# Patient Record
Sex: MALE | Race: OTHER | NOT HISPANIC OR LATINO | URBAN - METROPOLITAN AREA
[De-identification: names, ages, dates, MRNs, and addresses within clinical notes are randomized per-mention and may not be internally consistent; named-entity substitution may affect disease eponyms.]

---

## 2024-07-17 ENCOUNTER — OFFICE VISIT (OUTPATIENT)
Dept: PODIATRY | Facility: CLINIC | Age: 64
End: 2024-07-17
Payer: MEDICARE

## 2024-07-17 VITALS — WEIGHT: 249 LBS | HEIGHT: 67 IN | BODY MASS INDEX: 39.08 KG/M2

## 2024-07-17 DIAGNOSIS — E11.9 COMPREHENSIVE DIABETIC FOOT EXAMINATION, TYPE 2 DM, ENCOUNTER FOR: Primary | ICD-10-CM

## 2024-07-17 DIAGNOSIS — B35.3 TINEA PEDIS OF BOTH FEET: ICD-10-CM

## 2024-07-17 PROCEDURE — 3008F BODY MASS INDEX DOCD: CPT | Mod: CPTII,S$GLB,, | Performed by: PODIATRIST

## 2024-07-17 PROCEDURE — 1126F AMNT PAIN NOTED NONE PRSNT: CPT | Mod: CPTII,S$GLB,, | Performed by: PODIATRIST

## 2024-07-17 PROCEDURE — 99999 PR PBB SHADOW E&M-NEW PATIENT-LVL III: CPT | Mod: PBBFAC,,, | Performed by: PODIATRIST

## 2024-07-17 PROCEDURE — 99203 OFFICE O/P NEW LOW 30 MIN: CPT | Mod: S$GLB,,, | Performed by: PODIATRIST

## 2024-07-17 PROCEDURE — 1101F PT FALLS ASSESS-DOCD LE1/YR: CPT | Mod: CPTII,S$GLB,, | Performed by: PODIATRIST

## 2024-07-17 PROCEDURE — 1159F MED LIST DOCD IN RCRD: CPT | Mod: CPTII,S$GLB,, | Performed by: PODIATRIST

## 2024-07-17 PROCEDURE — 3288F FALL RISK ASSESSMENT DOCD: CPT | Mod: CPTII,S$GLB,, | Performed by: PODIATRIST

## 2024-07-17 RX ORDER — AMLODIPINE BESYLATE 10 MG/1
10 TABLET ORAL
COMMUNITY
Start: 2024-05-03

## 2024-07-17 RX ORDER — CALCIUM CITRATE/VITAMIN D3 200MG-6.25
TABLET ORAL
COMMUNITY
Start: 2024-05-31

## 2024-07-17 RX ORDER — CARVEDILOL 12.5 MG/1
12.5 TABLET ORAL 2 TIMES DAILY
COMMUNITY
Start: 2024-04-04

## 2024-07-17 RX ORDER — ATORVASTATIN CALCIUM 80 MG/1
80 TABLET, FILM COATED ORAL
COMMUNITY
Start: 2024-04-12

## 2024-07-17 RX ORDER — ACETAMINOPHEN 500 MG
1 TABLET ORAL
COMMUNITY

## 2024-07-17 RX ORDER — CICLOPIROX 7.7 MG/G
GEL TOPICAL 2 TIMES DAILY
Qty: 30 G | Refills: 1 | Status: SHIPPED | OUTPATIENT
Start: 2024-07-17

## 2024-07-17 RX ORDER — IRBESARTAN AND HYDROCHLOROTHIAZIDE 300; 12.5 MG/1; MG/1
1 TABLET, FILM COATED ORAL
COMMUNITY
Start: 2024-02-12

## 2024-07-17 RX ORDER — LINACLOTIDE 72 UG/1
72 CAPSULE, GELATIN COATED ORAL EVERY MORNING
COMMUNITY
Start: 2024-07-15

## 2024-07-17 RX ORDER — TRIAMCINOLONE ACETONIDE 1 MG/G
CREAM TOPICAL
COMMUNITY
Start: 2024-06-24 | End: 2025-06-24

## 2024-07-17 RX ORDER — METFORMIN HYDROCHLORIDE 500 MG/1
500 TABLET ORAL 2 TIMES DAILY
COMMUNITY
Start: 2024-06-22

## 2024-07-17 NOTE — PROGRESS NOTES
Podiatry Department    Patient ID: Juve Méndez is a 64 y.o. male.    Chief Complaint: Diabetic Foot Exam (Diabetic routine exam, diabetic, wears casual shoes and socks, last seen PCP Dr. Smith on )    History of Present Illness    CHIEF COMPLAINT:  Patient presents today for itchy feet.    ITCHY FEET:  Patient reports experiencing itching on the inside of his feet. He denies any burning, tingling, or numbness. He mentions being told by someone to stop clipping his toenails due to being diabetic.    DIABETES:  Patient reports regularly monitoring his blood sugar, with typical readings ranging between 117-120. He is uncertain about his most recent hemoglobin A1C result. He attempted to contact his doctor's office recently regarding this matter but did not receive a call back.    ROS:  Skin: +itching  Neurological: -numbness            Physical Exam    Skin: Maceration with scales, interdigital web spaces 1 through 4 on right foot. Maceration with scales, interdigital web spaces 2, 3, and 4 on left foot. Diffuse plantar dry skin. Discoloration on right and left hallux toenails consistent with onychomycosis.  Musculoskeletal: Pronated foot type with relaxed calcaneal stance position and eversion.  Neurological: Sensation intact.           Diagnoses:  1. Comprehensive diabetic foot examination, type 2 DM, encounter for    2. Tinea pedis of both feet    Other orders  -     ciclopirox 0.77 % Gel; Apply topically 2 (two) times daily.  Dispense: 30 g; Refill: 1        Assessment & Plan    - Patient has fungal infection in interdigital web spaces of feet causing itching, for which topical antifungal medication will be prescribed  - Patient is a well-controlled diabetic based on reported blood sugars  DIABETES:  - Educated on the risks of diabetic foot complications including neuropathy, circulation issues, skin problems, and amputation if blood sugars are not well-controlled.  - Discussed the importance of  maintaining good glycemic control to prevent these complications.  FUNGAL INFECTION:  - Prescribed topical antifungal medication to treat fungal infection in interdigital web spaces of feet.  - Apply antifungal medication first, then use antifungal powder afterwards.            No future appointments.     This note was generated with the assistance of ambient listening technology. Verbal consent was obtained by the patient and accompanying visitor(s) for the recording of patient appointment to facilitate this note. I attest to having reviewed and edited the generated note for accuracy, though some syntax or spelling errors may persist. Please contact the author of this note for any clarification.      Report Electronically Signed By:     Marzena Perez DPM   Podiatry  [facility]  7/17/2024

## 2024-11-12 RX ORDER — CICLOPIROX 7.7 MG/G
1 GEL TOPICAL 2 TIMES DAILY
Qty: 30 G | Refills: 0 | Status: SHIPPED | OUTPATIENT
Start: 2024-11-12